# Patient Record
Sex: FEMALE | Race: WHITE | NOT HISPANIC OR LATINO | Employment: STUDENT | URBAN - METROPOLITAN AREA
[De-identification: names, ages, dates, MRNs, and addresses within clinical notes are randomized per-mention and may not be internally consistent; named-entity substitution may affect disease eponyms.]

---
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
## 2024-06-11 ENCOUNTER — APPOINTMENT (OUTPATIENT)
Dept: LAB | Facility: MEDICAL CENTER | Age: 19
End: 2024-06-11
Payer: COMMERCIAL

## 2024-06-11 DIAGNOSIS — I51.9 MYXEDEMA HEART DISEASE: ICD-10-CM

## 2024-06-11 DIAGNOSIS — Z01.84 IMMUNITY STATUS TESTING: ICD-10-CM

## 2024-06-11 DIAGNOSIS — I73.00 RAYNAUD'S DISEASE WITHOUT GANGRENE: ICD-10-CM

## 2024-06-11 DIAGNOSIS — R53.83 OTHER FATIGUE: ICD-10-CM

## 2024-06-11 DIAGNOSIS — E03.9 MYXEDEMA HEART DISEASE: ICD-10-CM

## 2024-06-11 DIAGNOSIS — Z20.820 CONTACT WITH OR EXPOSURE TO VARICELLA: ICD-10-CM

## 2024-06-11 LAB
HBV SURFACE AB SER-ACNC: 3.62 MIU/ML
HCV AB SER QL: NORMAL
MEV IGG SER QL IA: NORMAL
MUV IGG SER QL IA: NORMAL
RUBV IGG SERPL IA-ACNC: 67.9 IU/ML
T3 SERPL-MCNC: 1.2 NG/ML
TSH SERPL DL<=0.05 MIU/L-ACNC: 1.35 UIU/ML (ref 0.45–4.5)
VZV IGG SER QL IA: NORMAL

## 2024-06-11 PROCEDURE — 86762 RUBELLA ANTIBODY: CPT

## 2024-06-11 PROCEDURE — 86765 RUBEOLA ANTIBODY: CPT

## 2024-06-11 PROCEDURE — 86235 NUCLEAR ANTIGEN ANTIBODY: CPT

## 2024-06-11 PROCEDURE — 86706 HEP B SURFACE ANTIBODY: CPT

## 2024-06-11 PROCEDURE — 86735 MUMPS ANTIBODY: CPT

## 2024-06-11 PROCEDURE — 84443 ASSAY THYROID STIM HORMONE: CPT

## 2024-06-11 PROCEDURE — 84480 ASSAY TRIIODOTHYRONINE (T3): CPT

## 2024-06-11 PROCEDURE — 36415 COLL VENOUS BLD VENIPUNCTURE: CPT

## 2024-06-11 PROCEDURE — 86803 HEPATITIS C AB TEST: CPT

## 2024-06-11 PROCEDURE — 86787 VARICELLA-ZOSTER ANTIBODY: CPT

## 2024-06-12 LAB
ENA SCL70 AB SER-ACNC: <0.2 AI (ref 0–0.9)
ENA SS-A AB SER-ACNC: <0.2 AI (ref 0–0.9)
ENA SS-B AB SER-ACNC: <0.2 AI (ref 0–0.9)

## 2024-06-19 DIAGNOSIS — N94.6 DYSMENORRHEA: ICD-10-CM

## 2024-06-20 RX ORDER — NORETHINDRONE ACETATE AND ETHINYL ESTRADIOL, AND FERROUS FUMARATE 1MG-20(24)
1 KIT ORAL DAILY
Qty: 28 TABLET | Refills: 0 | Status: SHIPPED | OUTPATIENT
Start: 2024-06-20

## 2024-07-11 DIAGNOSIS — N94.6 DYSMENORRHEA: ICD-10-CM

## 2024-07-11 RX ORDER — NORETHINDRONE ACETATE AND ETHINYL ESTRADIOL, AND FERROUS FUMARATE 1MG-20(24)
1 KIT ORAL DAILY
Qty: 28 TABLET | Refills: 0 | Status: SHIPPED | OUTPATIENT
Start: 2024-07-11

## 2024-08-12 DIAGNOSIS — N94.6 DYSMENORRHEA: ICD-10-CM

## 2024-08-13 RX ORDER — NORETHINDRONE ACETATE AND ETHINYL ESTRADIOL, AND FERROUS FUMARATE 1MG-20(24)
1 KIT ORAL DAILY
Qty: 28 TABLET | Refills: 3 | Status: SHIPPED | OUTPATIENT
Start: 2024-08-13

## 2024-11-26 DIAGNOSIS — N94.6 DYSMENORRHEA: ICD-10-CM

## 2024-11-26 RX ORDER — NORETHINDRONE ACETATE AND ETHINYL ESTRADIOL, AND FERROUS FUMARATE 1MG-20(24)
1 KIT ORAL DAILY
Qty: 28 TABLET | Refills: 3 | Status: SHIPPED | OUTPATIENT
Start: 2024-11-26

## 2024-11-26 NOTE — TELEPHONE ENCOUNTER
Pt yaima appt due to provider being out, not back in area until May due to school. Needs bcp refills until that time. Pt would like rx sent to [facility] in [place]

## 2025-03-29 DIAGNOSIS — N94.6 DYSMENORRHEA: ICD-10-CM

## 2025-03-31 RX ORDER — NORETHINDRONE ACETATE AND ETHINYL ESTRADIOL, AND FERROUS FUMARATE 1MG-20(24)
1 KIT ORAL DAILY
Qty: 28 TABLET | Refills: 2 | Status: SHIPPED | OUTPATIENT
Start: 2025-03-31

## 2025-06-26 DIAGNOSIS — N94.6 DYSMENORRHEA: ICD-10-CM

## 2025-06-30 ENCOUNTER — OFFICE VISIT (OUTPATIENT)
Dept: FAMILY MEDICINE CLINIC | Facility: CLINIC | Age: 20
End: 2025-06-30
Payer: COMMERCIAL

## 2025-06-30 VITALS
HEIGHT: 67 IN | BODY MASS INDEX: 21.97 KG/M2 | TEMPERATURE: 98.4 F | RESPIRATION RATE: 18 BRPM | DIASTOLIC BLOOD PRESSURE: 70 MMHG | WEIGHT: 140 LBS | OXYGEN SATURATION: 99 % | HEART RATE: 65 BPM | SYSTOLIC BLOOD PRESSURE: 120 MMHG

## 2025-06-30 DIAGNOSIS — K58.9 IRRITABLE BOWEL SYNDROME WITHOUT DIARRHEA: ICD-10-CM

## 2025-06-30 DIAGNOSIS — E55.9 VITAMIN D DEFICIENCY: ICD-10-CM

## 2025-06-30 DIAGNOSIS — Z00.00 ROUTINE ADULT HEALTH MAINTENANCE: Primary | ICD-10-CM

## 2025-06-30 PROCEDURE — 99385 PREV VISIT NEW AGE 18-39: CPT | Performed by: NURSE PRACTITIONER

## 2025-06-30 RX ORDER — NORETHINDRONE ACETATE AND ETHINYL ESTRADIOL, AND FERROUS FUMARATE 1MG-20(24)
1 KIT ORAL DAILY
Qty: 28 TABLET | Refills: 2 | Status: SHIPPED | OUTPATIENT
Start: 2025-06-30 | End: 2025-07-05 | Stop reason: SDUPTHER

## 2025-06-30 NOTE — PROGRESS NOTES
Name: Parul Larkin      : 2005      MRN: [id number]  Encounter Provider: YOLI Caro  Encounter Date: 2025   Encounter department: [facility]    Assessment & Plan  Routine adult health maintenance    Orders:    CBC and differential; Future    Comprehensive metabolic panel; Future    Lipid panel; Future    UA w Reflex to Microscopic w Reflex to Culture; Future    TSH + Free T4; Future    Irritable bowel syndrome without diarrhea    Orders:    Celiac Disease Comprehensive Panel; Future    Vitamin D deficiency    Orders:    Vitamin D 25 hydroxy; Future       Physical assessment unremarkable. Labs reviewed were WNL also VS were well controlled. Labs given is to complete in 6 mos for possible fu discussion. RTO as needed or for next scheduled appt. All questions answered.    History of Present Illness       Patient is here for routine follow-up.  To review most recent labs.  To also discuss current state of health and any new problems that they may be experiencing.  Patient states that medications taken as prescribed and very well tolerated no new complaints at this time.          Review of Systems   Constitutional:  Negative for appetite change and fever.   HENT:  Negative for sinus pressure and sore throat.    Eyes:  Negative for pain.   Respiratory:  Negative for shortness of breath.    Cardiovascular:  Negative for chest pain.   Gastrointestinal:  Positive for diarrhea. Negative for abdominal pain.   Genitourinary:  Negative for dysuria.   Musculoskeletal:  Negative for arthralgias and myalgias.   Skin:  Negative for color change.   Neurological:  Negative for light-headedness.   Psychiatric/Behavioral:  Negative for behavioral problems.      Past Medical History[1]  Past Surgical History[2]  Family History[3]  Social History[4]  Medications[5]  No Known Allergies  Immunization History   Administered Date(s) Administered    COVID-19 PFIZER VACCINE 0.3 ML IM 2021,  "07/24/2021    COVID-19 Pfizer vac (Luke-sucrose, gray cap) 12 yr+ IM 08/03/2022    HPV9 02/15/2021, 06/28/2021, 06/28/2021, 01/10/2022    Hep B, Adolescent or Pediatric 03/02/2005, 05/21/2005, 11/01/2006    INFLUENZA 09/28/2021, 09/24/2022    Influenza Injectable, MDCK 0.5 mL 08/15/2024    Influenza, injectable, quadrivalent, preservative free 0.5 mL 09/06/2020    MMR 11/07/2005, 05/14/2010    Meningococcal MCV4P 07/29/2021    Tdap 07/18/2016    Varicella 11/07/2005, 05/14/2010     Objective   /70 (BP Location: Left arm, Patient Position: Sitting, Cuff Size: Standard)   Pulse 65   Temp 98.4 °F (36.9 °C) (Temporal)   Resp 18   Ht 5' 6.5\" (1.689 m)   Wt 63.5 kg (140 lb)   LMP 05/31/2025 (Approximate)   SpO2 99%   BMI 22.26 kg/m²     Physical Exam  Vitals and nursing note reviewed.   Constitutional:       Appearance: Normal appearance. She is normal weight.   HENT:      Head: Normocephalic and atraumatic.      Right Ear: Tympanic membrane, ear canal and external ear normal.      Left Ear: Tympanic membrane, ear canal and external ear normal.      Nose: Nose normal.      Mouth/Throat:      Mouth: Mucous membranes are moist.     Cardiovascular:      Rate and Rhythm: Normal rate and regular rhythm.      Pulses: Normal pulses.      Heart sounds: Normal heart sounds.   Pulmonary:      Effort: Pulmonary effort is normal.      Breath sounds: Normal breath sounds.   Abdominal:      General: Abdomen is flat. Bowel sounds are normal.      Palpations: Abdomen is soft.     Musculoskeletal:         General: Normal range of motion.      Cervical back: Normal range of motion.     Neurological:      General: No focal deficit present.      Mental Status: She is oriented to person, place, and time.     Psychiatric:         Mood and Affect: Mood normal.         Behavior: Behavior normal.         Thought Content: Thought content normal.         Judgment: Judgment normal.              [1]   Past Medical History:  Diagnosis " Date    Anemia     Migraine     Varicella    [2]   Past Surgical History:  Procedure Laterality Date    WISDOM TOOTH EXTRACTION     [3]   Family History  Problem Relation Name Age of Onset    No Known Problems Mother      No Known Problems Father      No Known Problems Maternal Grandmother      No Known Problems Maternal Grandfather      No Known Problems Paternal Grandmother      Brain cancer Paternal Grandfather     [4]   Social History  Tobacco Use    Smoking status: Never    Smokeless tobacco: Never   Vaping Use    Vaping status: Never Used   Substance and Sexual Activity    Alcohol use: Never    Drug use: Never    Sexual activity: Never   [5]   Current Outpatient Medications on File Prior to Visit   Medication Sig    Multiple Vitamins-Minerals (multivitamin with minerals) tablet Take 1 tablet by mouth in the morning.    norethindrone-ethinyl estradiol-ferrous fumarate (Myriam 24 Fe) 1-20 MG-MCG(24) per tablet take 1 tablet by mouth once daily    clindamycin (CLINDAGEL) 1 % gel Apply topically 2 (two) times a day (Patient not taking: Reported on 6/30/2025)

## 2025-06-30 NOTE — TELEPHONE ENCOUNTER
Spoke to Ms. Larkin and scheduled her for an annual exam on Wednesday, July 23, 2025 @2:00pm with YOLI Mckeon in the [place] Office. Please send 1 refill on her B/C pills to the [facility] in [place], [place].

## 2025-07-05 ENCOUNTER — APPOINTMENT (OUTPATIENT)
Dept: LAB | Facility: MEDICAL CENTER | Age: 20
End: 2025-07-05
Attending: NURSE PRACTITIONER
Payer: COMMERCIAL

## 2025-07-05 DIAGNOSIS — K58.9 IRRITABLE BOWEL SYNDROME WITHOUT DIARRHEA: ICD-10-CM

## 2025-07-05 DIAGNOSIS — N94.6 DYSMENORRHEA: ICD-10-CM

## 2025-07-05 DIAGNOSIS — Z00.00 ROUTINE ADULT HEALTH MAINTENANCE: ICD-10-CM

## 2025-07-05 DIAGNOSIS — E55.9 VITAMIN D DEFICIENCY: ICD-10-CM

## 2025-07-05 LAB
25(OH)D3 SERPL-MCNC: 79.9 NG/ML (ref 30–100)
ALBUMIN SERPL BCG-MCNC: 4.5 G/DL (ref 3.5–5)
ALP SERPL-CCNC: 63 U/L (ref 34–104)
ALT SERPL W P-5'-P-CCNC: 18 U/L (ref 7–52)
ANION GAP SERPL CALCULATED.3IONS-SCNC: 9 MMOL/L (ref 4–13)
AST SERPL W P-5'-P-CCNC: 27 U/L (ref 13–39)
BASOPHILS # BLD AUTO: 0.02 THOUSANDS/ÂΜL (ref 0–0.1)
BASOPHILS NFR BLD AUTO: 0 % (ref 0–1)
BILIRUB SERPL-MCNC: 0.96 MG/DL (ref 0.2–1)
BILIRUB UR QL STRIP: NEGATIVE
BUN SERPL-MCNC: 14 MG/DL (ref 5–25)
CALCIUM SERPL-MCNC: 9.4 MG/DL (ref 8.4–10.2)
CHLORIDE SERPL-SCNC: 103 MMOL/L (ref 96–108)
CHOLEST SERPL-MCNC: 173 MG/DL (ref ?–200)
CLARITY UR: CLEAR
CO2 SERPL-SCNC: 26 MMOL/L (ref 21–32)
COLOR UR: COLORLESS
CREAT SERPL-MCNC: 1.21 MG/DL (ref 0.6–1.3)
EOSINOPHIL # BLD AUTO: 0.16 THOUSAND/ÂΜL (ref 0–0.61)
EOSINOPHIL NFR BLD AUTO: 4 % (ref 0–6)
ERYTHROCYTE [DISTWIDTH] IN BLOOD BY AUTOMATED COUNT: 14.3 % (ref 11.6–15.1)
GFR SERPL CREATININE-BSD FRML MDRD: 64 ML/MIN/1.73SQ M
GLUCOSE P FAST SERPL-MCNC: 85 MG/DL (ref 65–99)
GLUCOSE UR STRIP-MCNC: NEGATIVE MG/DL
HCT VFR BLD AUTO: 37.9 % (ref 34.8–46.1)
HDLC SERPL-MCNC: 76 MG/DL
HGB BLD-MCNC: 11.8 G/DL (ref 11.5–15.4)
HGB UR QL STRIP.AUTO: NEGATIVE
IGA SERPL-MCNC: 93 MG/DL (ref 66–433)
IMM GRANULOCYTES # BLD AUTO: 0.01 THOUSAND/UL (ref 0–0.2)
IMM GRANULOCYTES NFR BLD AUTO: 0 % (ref 0–2)
KETONES UR STRIP-MCNC: NEGATIVE MG/DL
LDLC SERPL CALC-MCNC: 87 MG/DL (ref 0–100)
LEUKOCYTE ESTERASE UR QL STRIP: NEGATIVE
LYMPHOCYTES # BLD AUTO: 1.32 THOUSANDS/ÂΜL (ref 0.6–4.47)
LYMPHOCYTES NFR BLD AUTO: 29 % (ref 14–44)
MCH RBC QN AUTO: 26.9 PG (ref 26.8–34.3)
MCHC RBC AUTO-ENTMCNC: 31.1 G/DL (ref 31.4–37.4)
MCV RBC AUTO: 86 FL (ref 82–98)
MONOCYTES # BLD AUTO: 0.43 THOUSAND/ÂΜL (ref 0.17–1.22)
MONOCYTES NFR BLD AUTO: 9 % (ref 4–12)
NEUTROPHILS # BLD AUTO: 2.69 THOUSANDS/ÂΜL (ref 1.85–7.62)
NEUTS SEG NFR BLD AUTO: 58 % (ref 43–75)
NITRITE UR QL STRIP: NEGATIVE
NONHDLC SERPL-MCNC: 97 MG/DL
NRBC BLD AUTO-RTO: 0 /100 WBCS
PH UR STRIP.AUTO: 6.5 [PH]
PLATELET # BLD AUTO: 315 THOUSANDS/UL (ref 149–390)
PMV BLD AUTO: 10.3 FL (ref 8.9–12.7)
POTASSIUM SERPL-SCNC: 4.3 MMOL/L (ref 3.5–5.3)
PROT SERPL-MCNC: 7.1 G/DL (ref 6.4–8.4)
PROT UR STRIP-MCNC: NEGATIVE MG/DL
RBC # BLD AUTO: 4.39 MILLION/UL (ref 3.81–5.12)
SODIUM SERPL-SCNC: 138 MMOL/L (ref 135–147)
SP GR UR STRIP.AUTO: 1.01 (ref 1–1.03)
T4 FREE SERPL-MCNC: 0.92 NG/DL (ref 0.61–1.12)
TRIGL SERPL-MCNC: 52 MG/DL (ref ?–150)
TSH SERPL DL<=0.05 MIU/L-ACNC: 1.18 UIU/ML (ref 0.45–4.5)
UROBILINOGEN UR STRIP-ACNC: <2 MG/DL
WBC # BLD AUTO: 4.63 THOUSAND/UL (ref 4.31–10.16)

## 2025-07-05 PROCEDURE — 81003 URINALYSIS AUTO W/O SCOPE: CPT

## 2025-07-05 PROCEDURE — 82784 ASSAY IGA/IGD/IGG/IGM EACH: CPT

## 2025-07-05 PROCEDURE — 82306 VITAMIN D 25 HYDROXY: CPT

## 2025-07-05 PROCEDURE — 86364 TISS TRNSGLTMNASE EA IG CLAS: CPT

## 2025-07-05 PROCEDURE — 36415 COLL VENOUS BLD VENIPUNCTURE: CPT

## 2025-07-05 PROCEDURE — 80061 LIPID PANEL: CPT

## 2025-07-05 PROCEDURE — 80053 COMPREHEN METABOLIC PANEL: CPT

## 2025-07-05 PROCEDURE — 84443 ASSAY THYROID STIM HORMONE: CPT

## 2025-07-05 PROCEDURE — 84439 ASSAY OF FREE THYROXINE: CPT

## 2025-07-05 PROCEDURE — 85025 COMPLETE CBC W/AUTO DIFF WBC: CPT

## 2025-07-05 PROCEDURE — 86258 DGP ANTIBODY EACH IG CLASS: CPT

## 2025-07-07 RX ORDER — NORETHINDRONE ACETATE AND ETHINYL ESTRADIOL, AND FERROUS FUMARATE 1MG-20(24)
1 KIT ORAL DAILY
Qty: 28 TABLET | Refills: 0 | Status: SHIPPED | OUTPATIENT
Start: 2025-07-07

## 2025-07-14 LAB
GLIADIN IGG SER IA-ACNC: <1.4 U/ML (ref ?–10)
GLIADIN PEPTIDE IGA SER IA-ACNC: 0.5 U/ML (ref ?–10)
TTG IGA SER IA-ACNC: 1.3 U/ML (ref ?–10)
TTG IGG SER IA-ACNC: <1.7 U/ML (ref ?–10)

## 2025-07-16 ENCOUNTER — TELEMEDICINE (OUTPATIENT)
Dept: FAMILY MEDICINE CLINIC | Facility: CLINIC | Age: 20
End: 2025-07-16
Payer: COMMERCIAL

## 2025-07-16 DIAGNOSIS — K58.2 IRRITABLE BOWEL SYNDROME WITH BOTH CONSTIPATION AND DIARRHEA: Primary | ICD-10-CM

## 2025-07-16 PROCEDURE — 99213 OFFICE O/P EST LOW 20 MIN: CPT | Performed by: NURSE PRACTITIONER

## 2025-07-16 NOTE — PROGRESS NOTES
Virtual Regular Visit  Name: Parul Larkin      : 2005      MRN: [id number]  Encounter Provider: YOLI Caro  Encounter Date: 2025   Encounter department: [facility]  :  Assessment & Plan  Irritable bowel syndrome with both constipation and diarrhea    Orders:    Ambulatory Referral to Gastroenterology; Future    Labs reviewed all labs found to be within normal limit.  Patient is subjectively having gastrointestinal difficulty constipation and diarrhea intermittently feels she might have irritable bowel disease would like to be evaluated will refer to gastroenterology for a follow-up consult.  They will contact her for the time and the date for the appointment in mid.      History of Present Illness     HPI  Review of Systems   Gastrointestinal:  Positive for abdominal distention, constipation and diarrhea. Negative for nausea.       Objective   LMP 2025 (Approximate)     Physical Exam  Constitutional:       Appearance: Normal appearance.     Neurological:      General: No focal deficit present.      Mental Status: She is alert and oriented to person, place, and time.         Administrative Statements   Encounter provider YOLI Caro    The Patient is located at Home and in the following state in which I hold an active license [place].    The patient was identified by name and date of birth. Parul aLrkin was informed that this is a telemedicine visit and that the visit is being conducted through the Epic Embedded platform. She agrees to proceed..  My office door was closed. No one else was in the room.  She acknowledged consent and understanding of privacy and security of the video platform. The patient has agreed to participate and understands they can discontinue the visit at any time.    I have spent a total time of 10 minutes in caring for this patient on the day of the visit/encounter including Impressions, not including the time spent for establishing the  audio/video connection.

## 2025-07-21 NOTE — PROGRESS NOTES
Name: Parul Larkin      : 2005      MRN: [id number]  Encounter Provider: YOLI Sommer  Encounter Date: 2025   Encounter department: [facility] [place]    :  Assessment & Plan  Encounter for gynecological examination (general) (routine) without abnormal findings  Annual GYN examination completed today.   Health maintenance reviewed/updated as appropriate  Risk prevention and anticipatory guidance provided including:  Age related Calcium and vitamin D intake  Encouraged Breast self exams and to call with changes.  Dietary and lifestyle recommendations based on her age and weight. body mass index is 22.42 kg/m²..    Tobacco and alcohol use, intervention ordered if applicable.   Condom use for prevention of STI's. Gc/ct  STI Screening.       Surveillance of contraceptive pill  Having BTB on current OCP- see other A&P  Also having mood issue and possible cyclic GI issues.   Considering IUD in the future.  Tolerated pelvic exam well and could consider   Orders:  •  norgestrel-ethinyl estradiol (Low-Ogestrel) 0.3 mg-30 mcg per tablet; Take 1 tablet by mouth daily    Breakthrough bleeding on OCPs  D/C Junel 24, start lo-ogestrel. GC/Ct collected to r/o STI. If not resolved by month 3 on new OCP- will do labs and US for further assessment   Orders:  •  norgestrel-ethinyl estradiol (Low-Ogestrel) 0.3 mg-30 mcg per tablet; Take 1 tablet by mouth daily    Dysmenorrhea  Primary use for hormonal contraception- and is effective for this.    Orders:  •  norgestrel-ethinyl estradiol (Low-Ogestrel) 0.3 mg-30 mcg per tablet; Take 1 tablet by mouth daily    Screen for STD (sexually transmitted disease)    Orders:  •  Chlamydia/GC amplified DNA by PCR               History of Present Illness     Parul Larkin is a 19 y.o. female.She is here for Gynecologic Exam (Birth control irina /Std testing/Gardasil completed )      Periods are regular and monthly but having some BTB on OCP  seems to be mid cycle. lIght and not heavy and not every single month.Does a pretty good job taking at the same time daily. Within 2 hours of waking daily. . Is on primarily for dysmenorrhea.   Is having some GI side effects and seeing GI soon. + constipation and bloating/discomfort.    Also more mood issues the longer she is on OCP.  Did start therapy as college transition has been hard.     She denies any C/O abnormal vaginal discharge or irritation. Denies Urinary complaints or breast changes    Sexually active: no vaginal sex yet, has done oral sex.   Denies C/O related to intimacy/sexual activity    She reports she feels safe at home.   Lifestyle/exercise:very active- plays  field hockey at [employer]  Calcium/vit D  intake: adequate      HEALTH MAINTENANCE SCREENINGS:     Previous pap smears and ASCCP screening guidelines have been reviewed.   Last Pap:  Not on file; Next due 2026  History of abnormal pap: N/A      IMMUNIZATIONS  Gardasil HPV vaccine Was completed    Hereditary Cancer Screening  FH Breast cancer: no  FH Ovarian cancer:no  FH Uterine cancer: no  FH Colon ca: no    Cancer-related family history includes Brain cancer in her paternal grandfather; Cancer in her paternal grandfather.    Substance Abuse Screening Completed. See hx and flowsheet.    Review of Systems   Constitutional:  Negative for appetite change, chills, fatigue, fever and unexpected weight change.   HENT: Negative.     Eyes: Negative.    Respiratory:  Negative for chest tightness and shortness of breath.    Cardiovascular:  Negative for chest pain and palpitations.   Gastrointestinal:  Negative for abdominal pain, constipation and vomiting.   Genitourinary:         As per HPI   Skin:  Negative for color change and rash.   Neurological:  Negative for dizziness.   Psychiatric/Behavioral: Negative.       The following portions of the patient's history were reviewed and updated as appropriate: allergies, current medications, past  "family history, past medical history, past social history, past surgical history, and problem list.      Objective:   Objective   /70 (BP Location: Left arm, Patient Position: Sitting, Cuff Size: Standard)   Ht 5' 6.5\" (1.689 m)   Wt 64 kg (141 lb)   LMP 06/09/2025 (Approximate)   BMI 22.42 kg/m²     Physical Exam  Constitutional:       General: She is not in acute distress.     Appearance: Normal appearance.   Genitourinary:      Vulva and rectum normal.      No lesions in the vagina.      Right Labia: No rash or lesions.     Left Labia: No lesions or rash.     No vaginal discharge, erythema, tenderness or bleeding.        Right Adnexa: not tender and no mass present.     Left Adnexa: not tender and no mass present.     No cervical motion tenderness, discharge or friability.      Uterus is not enlarged or tender.      No urethral prolapse present.      Pelvic exam was performed with patient in the lithotomy position.   Breasts:     Breasts are symmetrical.      Right: No inverted nipple, mass, nipple discharge, skin change or tenderness.      Left: No inverted nipple, mass, nipple discharge, skin change or tenderness.   HENT:      Head: Normocephalic and atraumatic.     Cardiovascular:      Rate and Rhythm: Normal rate.      Heart sounds: No murmur heard.  Pulmonary:      Effort: Pulmonary effort is normal.      Breath sounds: Normal breath sounds.   Abdominal:      General: There is no distension.      Palpations: Abdomen is soft.      Tenderness: There is no abdominal tenderness.     Musculoskeletal:         General: Normal range of motion.      Cervical back: Normal range of motion.   Lymphadenopathy:      Cervical: No cervical adenopathy.     Neurological:      Mental Status: She is alert and oriented to person, place, and time.     Skin:     General: Skin is warm and dry.     Psychiatric:         Mood and Affect: Mood normal.         Behavior: Behavior normal.   Vitals reviewed.                      "

## 2025-07-23 ENCOUNTER — ANNUAL EXAM (OUTPATIENT)
Dept: OBGYN CLINIC | Facility: MEDICAL CENTER | Age: 20
End: 2025-07-23
Payer: COMMERCIAL

## 2025-07-23 VITALS
HEIGHT: 67 IN | SYSTOLIC BLOOD PRESSURE: 108 MMHG | DIASTOLIC BLOOD PRESSURE: 70 MMHG | WEIGHT: 141 LBS | BODY MASS INDEX: 22.13 KG/M2

## 2025-07-23 DIAGNOSIS — N92.1 BREAKTHROUGH BLEEDING ON OCPS: ICD-10-CM

## 2025-07-23 DIAGNOSIS — N94.6 DYSMENORRHEA: ICD-10-CM

## 2025-07-23 DIAGNOSIS — Z30.41 SURVEILLANCE OF CONTRACEPTIVE PILL: ICD-10-CM

## 2025-07-23 DIAGNOSIS — Z11.3 SCREEN FOR STD (SEXUALLY TRANSMITTED DISEASE): ICD-10-CM

## 2025-07-23 DIAGNOSIS — Z01.419 ENCOUNTER FOR GYNECOLOGICAL EXAMINATION (GENERAL) (ROUTINE) WITHOUT ABNORMAL FINDINGS: Primary | ICD-10-CM

## 2025-07-23 PROCEDURE — 87491 CHLMYD TRACH DNA AMP PROBE: CPT | Performed by: CLINICAL NURSE SPECIALIST

## 2025-07-23 PROCEDURE — S0612 ANNUAL GYNECOLOGICAL EXAMINA: HCPCS | Performed by: CLINICAL NURSE SPECIALIST

## 2025-07-23 PROCEDURE — 87591 N.GONORRHOEAE DNA AMP PROB: CPT | Performed by: CLINICAL NURSE SPECIALIST

## 2025-07-23 RX ORDER — NORGESTREL AND ETHINYL ESTRADIOL 0.3-0.03MG
1 KIT ORAL DAILY
Qty: 84 TABLET | Refills: 4 | Status: SHIPPED | OUTPATIENT
Start: 2025-07-23

## 2025-07-23 NOTE — PATIENT INSTRUCTIONS
The following list are bladder irritants to avoid:  Tomatoes  Coffee/tea  Chocolate  Alcohol  Citrus Fruit-Orange/lemon/lime/grapefruit  Carbonated beverages  Spicy Foods  Sweeteners- Natural/Artificial  Onions  Cranberries    Bladder Training    Bladder training is an important form of behavior therapy that can be effective in treating urinary incontinence.    The goals are to increase the amount of time between emptying your bladder and the amount of fluid your bladder can hold. It can also diminish the sense of urgency or leakage.    Bladder training requires that a  fixed voiding schedule be established, whether or not the urge to urinate is present.  If urge occurs before the assigned interval, urge suppression techniques (such as relaxation and Kegel's exercises) should be used. As success is achieved, the interval is lengthened in 15-30 minutes increments until it is possible to remain comfortable for 3-4 hours between voids. This goal can be individualized to suit each woman's needs and desires.      Instructions:    Empty your bladder as soon as you get up in the morning, This starts your retraining schedule.  Go to the bathroom at specific times for example, every 2 hours. Wait the full amount of time before you urinate again. Empty your bladder even if you do not have the urge to urinate at this time.  Follow this schedule during waking hours only.  During the nighttime, go to the bathroom only if you awaken and find it necessary.  When the urge to urinate is felt before the next designated time, use urge suppression techniques or try relaxation techniques like deep breathing.  Focus on relaxing all other muscles.  If possible, sit down until the sensation passes.  If the urge is suppressed, adhere to the schedule.  If you cannot suppress the urge, wait 5 minutes then slowly make your way to the bathroom then re-established the schedule.  Repeat this process every time an urge is felt.  When you have  accomplished your initial goal, gradually increase the time between emptying her bladder by 15 minutes intervals.  Try to increase her interval each week, but you will be the best  of how quickly you can advanced to the next step.  Increase the time between each urination until your reach a 3-4 hour voiding interval.  It should take between 6-12 weeks to accomplish her ultimate goal.  Don't be discouraged by set-backs.  You may find you might have good days and bad days.  As you continue bladder retraining, you will start to notice more and more good days, so keep practicing.  You will hasten your success by doing your pelvic muscles exercises faithfully every day.

## 2025-07-24 LAB
C TRACH DNA SPEC QL NAA+PROBE: NEGATIVE
N GONORRHOEA DNA SPEC QL NAA+PROBE: NEGATIVE

## 2025-08-06 ENCOUNTER — OFFICE VISIT (OUTPATIENT)
Dept: GASTROENTEROLOGY | Facility: CLINIC | Age: 20
End: 2025-08-06
Attending: NURSE PRACTITIONER
Payer: COMMERCIAL

## 2025-08-06 VITALS
SYSTOLIC BLOOD PRESSURE: 125 MMHG | HEIGHT: 67 IN | DIASTOLIC BLOOD PRESSURE: 82 MMHG | WEIGHT: 140 LBS | BODY MASS INDEX: 21.97 KG/M2 | HEART RATE: 62 BPM

## 2025-08-06 DIAGNOSIS — K58.2 IRRITABLE BOWEL SYNDROME WITH BOTH CONSTIPATION AND DIARRHEA: ICD-10-CM

## 2025-08-06 PROCEDURE — 99204 OFFICE O/P NEW MOD 45 MIN: CPT | Performed by: PHYSICIAN ASSISTANT

## 2025-08-06 RX ORDER — DICYCLOMINE HCL 20 MG
20 TABLET ORAL EVERY 6 HOURS
Qty: 120 TABLET | Refills: 3 | Status: SHIPPED | OUTPATIENT
Start: 2025-08-06